# Patient Record
Sex: MALE | Race: OTHER | HISPANIC OR LATINO | URBAN - METROPOLITAN AREA
[De-identification: names, ages, dates, MRNs, and addresses within clinical notes are randomized per-mention and may not be internally consistent; named-entity substitution may affect disease eponyms.]

---

## 2022-06-22 ENCOUNTER — EMERGENCY (EMERGENCY)
Facility: HOSPITAL | Age: 52
LOS: 1 days | Discharge: HOME | End: 2022-06-22
Attending: STUDENT IN AN ORGANIZED HEALTH CARE EDUCATION/TRAINING PROGRAM | Admitting: STUDENT IN AN ORGANIZED HEALTH CARE EDUCATION/TRAINING PROGRAM
Payer: MEDICARE

## 2022-06-22 VITALS
RESPIRATION RATE: 16 BRPM | OXYGEN SATURATION: 100 % | WEIGHT: 175.05 LBS | TEMPERATURE: 98 F | DIASTOLIC BLOOD PRESSURE: 90 MMHG | HEART RATE: 70 BPM | HEIGHT: 66 IN | SYSTOLIC BLOOD PRESSURE: 150 MMHG

## 2022-06-22 VITALS
SYSTOLIC BLOOD PRESSURE: 146 MMHG | TEMPERATURE: 98 F | RESPIRATION RATE: 16 BRPM | DIASTOLIC BLOOD PRESSURE: 83 MMHG | HEART RATE: 65 BPM | OXYGEN SATURATION: 100 %

## 2022-06-22 DIAGNOSIS — E11.9 TYPE 2 DIABETES MELLITUS WITHOUT COMPLICATIONS: ICD-10-CM

## 2022-06-22 DIAGNOSIS — R33.9 RETENTION OF URINE, UNSPECIFIED: ICD-10-CM

## 2022-06-22 DIAGNOSIS — I10 ESSENTIAL (PRIMARY) HYPERTENSION: ICD-10-CM

## 2022-06-22 DIAGNOSIS — E78.5 HYPERLIPIDEMIA, UNSPECIFIED: ICD-10-CM

## 2022-06-22 DIAGNOSIS — R14.0 ABDOMINAL DISTENSION (GASEOUS): ICD-10-CM

## 2022-06-22 DIAGNOSIS — K59.00 CONSTIPATION, UNSPECIFIED: ICD-10-CM

## 2022-06-22 LAB
ALBUMIN SERPL ELPH-MCNC: 4.8 G/DL — SIGNIFICANT CHANGE UP (ref 3.3–5)
ALP SERPL-CCNC: 92 U/L — SIGNIFICANT CHANGE UP (ref 40–120)
ALT FLD-CCNC: 11 U/L — SIGNIFICANT CHANGE UP (ref 10–45)
ANION GAP SERPL CALC-SCNC: 14 MMOL/L — SIGNIFICANT CHANGE UP (ref 5–17)
APPEARANCE UR: CLEAR — SIGNIFICANT CHANGE UP
AST SERPL-CCNC: 21 U/L — SIGNIFICANT CHANGE UP (ref 10–40)
BASOPHILS # BLD AUTO: 0.04 K/UL — SIGNIFICANT CHANGE UP (ref 0–0.2)
BASOPHILS NFR BLD AUTO: 0.5 % — SIGNIFICANT CHANGE UP (ref 0–2)
BILIRUB SERPL-MCNC: 0.7 MG/DL — SIGNIFICANT CHANGE UP (ref 0.2–1.2)
BILIRUB UR-MCNC: NEGATIVE — SIGNIFICANT CHANGE UP
BUN SERPL-MCNC: 25 MG/DL — HIGH (ref 7–23)
CALCIUM SERPL-MCNC: 9.9 MG/DL — SIGNIFICANT CHANGE UP (ref 8.4–10.5)
CHLORIDE SERPL-SCNC: 102 MMOL/L — SIGNIFICANT CHANGE UP (ref 96–108)
CO2 SERPL-SCNC: 24 MMOL/L — SIGNIFICANT CHANGE UP (ref 22–31)
COLOR SPEC: YELLOW — SIGNIFICANT CHANGE UP
CREAT SERPL-MCNC: 1.12 MG/DL — SIGNIFICANT CHANGE UP (ref 0.5–1.3)
DIFF PNL FLD: NEGATIVE — SIGNIFICANT CHANGE UP
EGFR: 79 ML/MIN/1.73M2 — SIGNIFICANT CHANGE UP
EOSINOPHIL # BLD AUTO: 0.05 K/UL — SIGNIFICANT CHANGE UP (ref 0–0.5)
EOSINOPHIL NFR BLD AUTO: 0.6 % — SIGNIFICANT CHANGE UP (ref 0–6)
GLUCOSE SERPL-MCNC: 122 MG/DL — HIGH (ref 70–99)
GLUCOSE UR QL: NEGATIVE — SIGNIFICANT CHANGE UP
HCT VFR BLD CALC: 33.8 % — LOW (ref 39–50)
HGB BLD-MCNC: 11.5 G/DL — LOW (ref 13–17)
IMM GRANULOCYTES NFR BLD AUTO: 0.3 % — SIGNIFICANT CHANGE UP (ref 0–1.5)
KETONES UR-MCNC: NEGATIVE — SIGNIFICANT CHANGE UP
LEUKOCYTE ESTERASE UR-ACNC: NEGATIVE — SIGNIFICANT CHANGE UP
LYMPHOCYTES # BLD AUTO: 1.05 K/UL — SIGNIFICANT CHANGE UP (ref 1–3.3)
LYMPHOCYTES # BLD AUTO: 13.5 % — SIGNIFICANT CHANGE UP (ref 13–44)
MCHC RBC-ENTMCNC: 31 PG — SIGNIFICANT CHANGE UP (ref 27–34)
MCHC RBC-ENTMCNC: 34 GM/DL — SIGNIFICANT CHANGE UP (ref 32–36)
MCV RBC AUTO: 91.1 FL — SIGNIFICANT CHANGE UP (ref 80–100)
MONOCYTES # BLD AUTO: 0.4 K/UL — SIGNIFICANT CHANGE UP (ref 0–0.9)
MONOCYTES NFR BLD AUTO: 5.1 % — SIGNIFICANT CHANGE UP (ref 2–14)
NEUTROPHILS # BLD AUTO: 6.23 K/UL — SIGNIFICANT CHANGE UP (ref 1.8–7.4)
NEUTROPHILS NFR BLD AUTO: 80 % — HIGH (ref 43–77)
NITRITE UR-MCNC: NEGATIVE — SIGNIFICANT CHANGE UP
NRBC # BLD: 0 /100 WBCS — SIGNIFICANT CHANGE UP (ref 0–0)
PH UR: 5.5 — SIGNIFICANT CHANGE UP (ref 5–8)
PLATELET # BLD AUTO: 191 K/UL — SIGNIFICANT CHANGE UP (ref 150–400)
POTASSIUM SERPL-MCNC: 4.3 MMOL/L — SIGNIFICANT CHANGE UP (ref 3.5–5.3)
POTASSIUM SERPL-SCNC: 4.3 MMOL/L — SIGNIFICANT CHANGE UP (ref 3.5–5.3)
PROT SERPL-MCNC: 8.3 G/DL — SIGNIFICANT CHANGE UP (ref 6–8.3)
PROT UR-MCNC: NEGATIVE MG/DL — SIGNIFICANT CHANGE UP
RBC # BLD: 3.71 M/UL — LOW (ref 4.2–5.8)
RBC # FLD: 17.4 % — HIGH (ref 10.3–14.5)
SODIUM SERPL-SCNC: 140 MMOL/L — SIGNIFICANT CHANGE UP (ref 135–145)
SP GR SPEC: 1.01 — SIGNIFICANT CHANGE UP (ref 1–1.03)
UROBILINOGEN FLD QL: 0.2 E.U./DL — SIGNIFICANT CHANGE UP
WBC # BLD: 7.79 K/UL — SIGNIFICANT CHANGE UP (ref 3.8–10.5)
WBC # FLD AUTO: 7.79 K/UL — SIGNIFICANT CHANGE UP (ref 3.8–10.5)

## 2022-06-22 PROCEDURE — 80053 COMPREHEN METABOLIC PANEL: CPT

## 2022-06-22 PROCEDURE — 81003 URINALYSIS AUTO W/O SCOPE: CPT

## 2022-06-22 PROCEDURE — 99284 EMERGENCY DEPT VISIT MOD MDM: CPT | Mod: FS

## 2022-06-22 PROCEDURE — 99283 EMERGENCY DEPT VISIT LOW MDM: CPT | Mod: 25

## 2022-06-22 PROCEDURE — 36415 COLL VENOUS BLD VENIPUNCTURE: CPT

## 2022-06-22 PROCEDURE — 51702 INSERT TEMP BLADDER CATH: CPT

## 2022-06-22 PROCEDURE — 85025 COMPLETE CBC W/AUTO DIFF WBC: CPT

## 2022-06-22 RX ORDER — DOCUSATE SODIUM 100 MG
1 CAPSULE ORAL
Qty: 20 | Refills: 0
Start: 2022-06-22 | End: 2022-07-01

## 2022-06-22 RX ORDER — DIAZEPAM 5 MG
5 TABLET ORAL ONCE
Refills: 0 | Status: DISCONTINUED | OUTPATIENT
Start: 2022-06-22 | End: 2022-06-22

## 2022-06-22 RX ORDER — MULTIVIT WITH MIN/MFOLATE/K2 340-15/3 G
1 POWDER (GRAM) ORAL ONCE
Refills: 0 | Status: COMPLETED | OUTPATIENT
Start: 2022-06-22 | End: 2022-06-22

## 2022-06-22 RX ORDER — ACETAMINOPHEN 500 MG
650 TABLET ORAL ONCE
Refills: 0 | Status: COMPLETED | OUTPATIENT
Start: 2022-06-22 | End: 2022-06-22

## 2022-06-22 RX ADMIN — Medication 1 BOTTLE: at 21:18

## 2022-06-22 RX ADMIN — Medication 5 MILLIGRAM(S): at 20:19

## 2022-06-22 RX ADMIN — Medication 10 MILLIGRAM(S): at 21:18

## 2022-06-22 RX ADMIN — Medication 650 MILLIGRAM(S): at 21:18

## 2022-06-22 RX ADMIN — Medication 650 MILLIGRAM(S): at 17:00

## 2022-06-22 NOTE — ED PROVIDER NOTE - NSFOLLOWUPCLINICS_GEN_ALL_ED_FT
[facility]  Urology  [address], [address]  [place], [place] [address]  Phone: [phone]  Fax:

## 2022-06-22 NOTE — ED ADULT NURSE NOTE - OBJECTIVE STATEMENT
51M presents to ED for urinary rentention and lower abd pain x __ days. Pt denies N/V/D. Flores placed on arrival with 500cc output. Pt still c/o lower abd pain s/p flores.

## 2022-06-22 NOTE — ED PROVIDER NOTE - OBJECTIVE STATEMENT
#[id number]    50 y/o m hx HTN, HLD, DM presents c/o being unable to urinate in the past day, feeling lower abd distention and discomfort.  Pt also stating he has been straining to have a bowel movement over the past several days, had small blood in his stool 5 days ago, while straining today he noticed a small amount of blood.  #[id number]    50 y/o m hx HTN, HLD, DM presents c/o being unable to urinate in the past day, feeling lower abd distention and discomfort.  Pt also stating he has been straining to have a bowel movement over the past several days, had small blood in his stool 5 days ago, while straining today he noticed a small amount of blood.  Denies fever, n/v, back pain, numbness/weakness of ext, all other ROS negative.

## 2022-06-22 NOTE — ED PROVIDER NOTE - NS ED ATTENDING STATEMENT MOD
This was a shared visit with the AKIRA. I reviewed and verified the documentation and independently performed the documented:

## 2022-06-22 NOTE — ED ADULT TRIAGE NOTE - CHIEF COMPLAINT QUOTE
PT BIBEMS from home c/o urinary retention x 2 days associated w/ abdominal pain. PT w/ hx of cirrhosis. Denies any other sx.

## 2022-06-22 NOTE — ED PROVIDER NOTE - NSFOLLOWUPINSTRUCTIONS_ED_ALL_ED_FT
Retención urinaria aguda en los hombres    Acute Urinary Retention, Male       La retención urinaria aguda es selam afección por la cual la persona no puede orinar o solo puede orinar poco. Esta afección puede ocurrir de repente y durar poco tiempo. Si no se trata, puede tornarse duradera (crónica) y ocasionar daño renal u otras complicaciones graves.      ¿Cuáles son las causas?    Esta afección puede ser causada por lo siguiente:  •Obstrucción o estrechamiento del tubo que drena la vejiga (uretra). [place]rall puede sally sido provocado por selam cirugía, problemas en órganos cercanos o lesión en la vejiga o la uretra.      •Problemas con los nervios de la vejiga.      •Tumores en el área de la pelvis, la vejiga o la uretra.      •Ciertos medicamentos.      •Infección de vejiga o de las vías urinarias.      •Estreñimiento.        ¿Qué incrementa el riesgo?    Es más probable que esta afección se desarrolle en hombres mayores. A medida que los hombres envejecen, la próstata se puede agrandar y comenzar a presionar o apretar la vejiga o la uretra. Otras afecciones crónicas pueden aumentar el riesgo de retención urinaria aguda. Estas incluyen:  •Enfermedades, yuni la esclerosis múltiple.      •Lesiones en la médula motta.      •Diabetes.      •Trastornos cognitivos degenerativos, yuni delirios o demencia.      •Afecciones psicológicas. Un hombre puede aguantar la orina por un traumatismo o porque no quiere usar el baño.        ¿Cuáles son los signos o síntomas?    Los síntomas de esta afección incluyen:  •Dificultad para orinar.      •Dolor en la parte baja del abdomen.        ¿Cómo se diagnostica?    Esta afección se diagnostica en función de un examen físico y de los antecedentes médicos. También pueden hacerle otras pruebas, incluidas las siguientes:  •Selam ecografía de la vejiga, del riñón o de ambos.      •Análisis de leslie.      •Análisis de orina.      •Es posible que se necesiten exámenes adicionales, tales yuni exploración por tomografía computarizada (TC) resonancia magnética (RM), y pruebas funcionales del riñón o de la vejiga.        ¿Cómo se trata?    El tratamiento de esta afección puede incluir lo siguiente:  •Medicamentos.      •Colocar un tubo jones estéril (catéter) en la vejiga para drenar la orina del cuerpo. [place]rall recibe el nombre de catéter urinario permanente. Luego de que se inserta, el catéter se mantiene en troncoso lugar con un pequeño balón que se llena con agua estéril. La orina se drena desde el catéter hasta la bolsa de recolección fuera del cuerpo.      •Terapia conductual.      •Tratamiento para otras afecciones.      De ser necesario, puede recibir tratamiento en el hospital para problemas de la función renal o para tratar otras complicaciones.      Siga estas instrucciones en troncoso casa:    Medicamentos     •Use los medicamentos de venta nellie y los recetados solamente yuni se lo haya indicado el médico. Evite determinados medicamentos, tales yuni descongestivos, antihistamínicos y algunos medicamentos recetados. No tome medicamentos a menos que lo haya autorizado el médico.      •Si le recetaron un antibiótico, tómelo yuni se lo haya indicado el médico. No deje de usar el antibiótico aunque comience a sentirse mejor.      Indicaciones generales     • No consuma ningún producto que contenga nicotina o tabaco. Estos productos incluyen cigarrillos, tabaco para mascar y aparatos de vapeo, yuni los cigarrillos electrónicos. Si necesita ayuda para dejar de consumir estos productos, consulte al médico.      •Eliz suficiente líquido yuni para mantener la orina de color amarillo pálido.      •Si le robins colocado un catéter urinario permanente, siga las instrucciones del médico.      •Controle si hay cambios en los síntomas. Informe al médico si nota algún cambio.      •Si así se le indica, controle troncoso presión arterial en casa. Informe cualquier cambio en troncoso george yuni se lo haya indicado el médico.      •Cumpla con todas las visitas de seguimiento. [place]rall es importante.        Comuníquese con un médico si:    •Tiene contracciones de vejiga incómodas que no puede controlar (espasmos).      •Tiene pérdida de orina con los espasmos.        Solicite ayuda de inmediato si:    •Siente escalofríos o tiene fiebre.      •Observa leslie en la orina.    •Tiene un catéter y ocurre lo siguiente:  •El catéter stuart de drenar orina.      •El catéter se sale del lugar.          Resumen    •La retención urinaria aguda es selam afección por la cual la persona no puede orinar o solo puede orinar poco. Si no se trata, esta afección puede resultar en daño renal u otras complicaciones graves.      •Selam próstata agrandada puede causar esta afección. A medida que los hombres envejecen, la próstata se puede agrandar y comenzar a presionar o apretar la vejiga o la uretra.      •El tratamiento de esta afección puede incluir medicamentos y el uso de un catéter urinario permanente.      •Controle si hay cambios en los síntomas. Informe al médico si nota algún cambio.      Esta información no tiene yuni fin reemplazar el consejo del médico. Asegúrese de hacerle al médico cualquier pregunta que tenga.

## 2022-06-22 NOTE — ED PROVIDER NOTE - CLINICAL SUMMARY MEDICAL DECISION MAKING FREE TEXT BOX
52 y/o m hx HTN, HLD, DM presents in urinary retention, hasn't urinated in the past 2 days.  Pt unsure of diagnosis of BPH although has been in retention in the past requiring flores catheter.  Flores placed in ED, about 900cc clear urine returned, labs unremarkable, no evidence of infection.  Pt has been straining recently, had some blood in stool, rectal with no blood in ED, brown stool, will send rx for stool softener.  Will d/c with leg bag, f/u with urology in office, return to ED if sx worsen. 50 y/o m hx HTN, HLD, DM presents in urinary retention, hasn't urinated in the past 2 days.  Pt unsure of diagnosis of BPH although has been in retention in the past requiring flores catheter.  Flores placed in ED, about 900cc clear urine returned, labs unremarkable, no evidence of infection.  Pt has been straining recently, had some blood in stool, rectal with no blood in ED, brown stool, will send rx for stool softener.  Will d/c with leg bag, f/u GI for rectal bleeding, f/u with urology in office, return to ED if sx worsen.

## 2022-06-22 NOTE — ED PROVIDER NOTE - ATTENDING APP SHARED VISIT CONTRIBUTION OF CARE
agree with above HPI     ID [id number] and [id number]  51M c PMH of HTN, HLD, DM p/w inability to urinate since yesterday with associated abdominal discomfort. +hx of similar sx in past, was told was due to bph. denies back pain or back injury. denies hx of ivdu. states he was straining to have a BM and noticed a small amount of blood at that time. no hx of blood transfusion or gib. never had colonoscopy or endoscopy. reports chills denies fever/cough/cp/sob/nvd/hematuria/leg edema/rash. denies focal numbness/weakness.   GENERAL: Well appearing, well nourished, awake, alert and in NAD  ENMT: Airway patent, Nasal mucosa clear. Mouth with MMM. Throat has no vesicles, no oropharyngeal exudates and uvula is midline.  EYES: conjunctiva clear, sclera anicteric, PERRL  CARDIAC: Regular rate, regular rhythm.  Heart sounds S1, S2, no S3, S4. No murmurs, rubs or gallops.  RESPIRATORY: Breath sounds clear and equal in bilateral anterior lung fields, no wheezes/ronchi/stridor; pt breathing and speaking comfortably with no increased WOB, no accessory mm. use, no intercostal retractions, no nasal flaring  GI: +abdominal distention, mild bilat suprapubic ttp no rlq/rlq/luq/llq/epigastric ttp no rebound/guarding  51M c PMH of HTN, HLD, DM p/w inability to urinate since yesterday with associated abdominal discomfort. no red flags for cord compression. On exam, VS grossly wnl, abdomen distended with mild suprapubic ttp, pt states his abdominal pain improved after flores placed. h/h close to normal and ua shows no infection Cr 1.12 with unknown baseline. will prescribe laxative for constipation and instruct pt to followup with pmd and urology. explained risk of gib and to return to ED immediately if he has persistent gib. however likely 2/2 straining as per PA exam brown stool no hemorrhoid may be 2/2 anal fissure

## 2022-06-22 NOTE — ED PROVIDER NOTE - PATIENT PORTAL LINK FT
You can access the FollowMyHealth Patient Portal offered by [facility] by registering at the following website: [email or web address]. By joining [employer]’s FollowMyHealth portal, you will also be able to view your health information using other applications (apps) compatible with our system.

## 2022-07-07 PROBLEM — Z00.00 ENCOUNTER FOR PREVENTIVE HEALTH EXAMINATION: Status: ACTIVE | Noted: 2022-07-07

## 2022-07-08 ENCOUNTER — APPOINTMENT (OUTPATIENT)
Dept: UROLOGY | Facility: CLINIC | Age: 52
End: 2022-07-08

## 2022-07-08 VITALS
HEIGHT: 64 IN | SYSTOLIC BLOOD PRESSURE: 130 MMHG | BODY MASS INDEX: 22.2 KG/M2 | TEMPERATURE: 98 F | DIASTOLIC BLOOD PRESSURE: 75 MMHG | HEART RATE: 66 BPM | RESPIRATION RATE: 18 BRPM | OXYGEN SATURATION: 97 % | WEIGHT: 130 LBS

## 2022-07-08 DIAGNOSIS — E78.5 HYPERLIPIDEMIA, UNSPECIFIED: ICD-10-CM

## 2022-07-08 DIAGNOSIS — M1A.0610 IDIOPATHIC CHRONIC GOUT, RIGHT KNEE, W/OUT TOPHUS (TOPHI): ICD-10-CM

## 2022-07-08 DIAGNOSIS — T56.0X1A TOXIC EFFECT OF LEAD AND ITS COMPOUNDS, ACCIDENTAL (UNINTENTIONAL), INITIAL ENCOUNTER: ICD-10-CM

## 2022-07-08 DIAGNOSIS — Z78.9 OTHER SPECIFIED HEALTH STATUS: ICD-10-CM

## 2022-07-08 DIAGNOSIS — K74.60 UNSPECIFIED CIRRHOSIS OF LIVER: ICD-10-CM

## 2022-07-08 DIAGNOSIS — Z86.718 PERSONAL HISTORY OF OTHER VENOUS THROMBOSIS AND EMBOLISM: ICD-10-CM

## 2022-07-08 DIAGNOSIS — M10.9 GOUT, UNSPECIFIED: ICD-10-CM

## 2022-07-08 DIAGNOSIS — M10.141 TOXIC EFFECT OF LEAD AND ITS COMPOUNDS, ACCIDENTAL (UNINTENTIONAL), INITIAL ENCOUNTER: ICD-10-CM

## 2022-07-08 RX ORDER — ALLOPURINOL 100 MG/1
100 TABLET ORAL
Refills: 0 | Status: ACTIVE | COMMUNITY

## 2022-07-08 RX ORDER — FUROSEMIDE 20 MG/1
20 TABLET ORAL
Refills: 0 | Status: ACTIVE | COMMUNITY

## 2022-07-08 RX ORDER — RIVAROXABAN 20 MG/1
20 TABLET, FILM COATED ORAL
Refills: 0 | Status: ACTIVE | COMMUNITY

## 2022-07-08 RX ORDER — POLYETHYLENE GLYCOL 3350 17 G/17G
17 POWDER, FOR SOLUTION ORAL DAILY
Qty: 20 | Refills: 0 | Status: ACTIVE | OUTPATIENT
Start: 2022-07-08

## 2022-07-08 RX ORDER — LEVOTHYROXINE SODIUM 0.17 MG/1
TABLET ORAL
Refills: 0 | Status: ACTIVE | COMMUNITY

## 2022-07-08 RX ORDER — TAMSULOSIN HYDROCHLORIDE 0.4 MG/1
0.4 CAPSULE ORAL
Qty: 120 | Refills: 0 | Status: ACTIVE | OUTPATIENT
Start: 2022-07-08

## 2022-07-08 RX ORDER — ATORVASTATIN CALCIUM 80 MG/1
TABLET, FILM COATED ORAL
Refills: 0 | Status: ACTIVE | COMMUNITY

## 2022-07-08 RX ORDER — COLCHICINE 0.6 MG/1
0.6 CAPSULE ORAL
Refills: 0 | Status: ACTIVE | COMMUNITY

## 2022-07-25 NOTE — END OF VISIT
[] : Resident [FreeTextEntry3] : The pt was given stricted ER precautions however given his poor medical literacy we will follow him closely.

## 2022-07-25 NOTE — ASSESSMENT
[FreeTextEntry1] : 51M obligate Maori speaker in acute urinary retention s/p ED flores placement at [facility]\par \par We discussed the risks and benefits of flores catheter use in patients in acute urinary retention, focusing on the need to alleviate the obstruction and preparing the patient for independent voiding. We discussed an active trial of void, which the patient agreed to. Approximately 170 ccs of NS were instilled into the bladder at which point the patient had a bladder spasm and leaked around the flores, subsequently voiding with a PVR of 25 cc. The patient denied any history of bladder spasms, urinary urgency, frequency, incontinence, any prostate issues or bladder outlet obstruction, or other urologic history. However, given the patient's difficulty with healthcare literacy I asked for his consent to obtain all medical records from [facility], where he has been primarily seen, which he gave freely. \par \par 1. Discussed the importance of bowel regimen and prevention of constipation. Miralax sent for constipation\par 2. Discussed starting Flomax for presumed BPH/CARTER due to hx of retention\par 3. Discussed ER precautions for continued urinary retention\par 4. F/U clinic in 1 month for comprehensive review of records and discussion of urologic care goals

## 2022-07-25 NOTE — HISTORY OF PRESENT ILLNESS
[Urinary Retention] : urinary retention [Urinary Urgency] : urinary urgency [Nocturia] : nocturia [FreeTextEntry1] : Valentino Olson is a 51M obligate Vietnamese speaker who presents following up from [facility] emergency room where he was seen for acute urinary retention. He endorsed suprapubic discomfort with an associated bladder scan of 900 cc; a flores was placed and he was instructed to follow up outpatient for trial of void. He was found to have associated straining to defecate; he was diagnosed with constipation and anal fissures and was given a bowel regimen with GI follow up.\par \par He endorses two prior episodes of acute retention. He denies any other urinary symptoms, such as dysuria, frequency, urgency, or hematuria. He denies fevers, chills nausea, vomiting.\par \par Of note, the patient seems to have difficulty with medical literacy; he does not know the names of any of his providers, nor can he describe any of his medical history with any useful detail. [Urinary Incontinence] : no urinary incontinence [Weak Stream] : no weak stream [Intermittency] : no intermittency [Erectile Dysfunction] : no Erectile Dysfunction [Dysuria] : no dysuria [Hematuria - Gross] : no gross hematuria

## 2022-07-25 NOTE — PHYSICAL EXAM
[General Appearance - Well Developed] : well developed [General Appearance - Well Nourished] : well nourished [Normal Appearance] : normal appearance [Well Groomed] : well groomed [General Appearance - In No Acute Distress] : no acute distress [Abdomen Soft] : soft [Urethral Meatus] : meatus normal [Penis Abnormality] : normal uncircumcised penis [Scrotum] : the scrotum was normal [Testes Tenderness] : no tenderness of the testes [Skin Color & Pigmentation] : normal skin color and pigmentation [Skin Turgor] : supple [Heart Rate And Rhythm] : Heart rate and rhythm were normal [] : no respiratory distress [Respiration, Rhythm And Depth] : normal respiratory rhythm and effort [Exaggerated Use Of Accessory Muscles For Inspiration] : no accessory muscle use [Oriented To Time, Place, And Person] : oriented to person, place, and time [Affect] : the affect was normal [Mood] : the mood was normal [Normal Station and Gait] : the gait and station were normal for the patient's age [No Focal Deficits] : no focal deficits [FreeTextEntry1] : large; mild to moderate distention, no TTP, no guarding, no rebound

## 2022-08-05 ENCOUNTER — APPOINTMENT (OUTPATIENT)
Dept: UROLOGY | Facility: CLINIC | Age: 52
End: 2022-08-05

## 2022-08-12 ENCOUNTER — APPOINTMENT (OUTPATIENT)
Dept: UROLOGY | Facility: CLINIC | Age: 52
End: 2022-08-12